# Patient Record
Sex: FEMALE | Race: WHITE | Employment: OTHER | URBAN - METROPOLITAN AREA
[De-identification: names, ages, dates, MRNs, and addresses within clinical notes are randomized per-mention and may not be internally consistent; named-entity substitution may affect disease eponyms.]

---

## 2023-11-21 ENCOUNTER — OFFICE VISIT (OUTPATIENT)
Age: 83
End: 2023-11-21

## 2023-11-21 VITALS
SYSTOLIC BLOOD PRESSURE: 134 MMHG | HEIGHT: 63 IN | WEIGHT: 202 LBS | HEART RATE: 105 BPM | DIASTOLIC BLOOD PRESSURE: 85 MMHG | BODY MASS INDEX: 35.79 KG/M2

## 2023-11-21 DIAGNOSIS — I10 PRIMARY HYPERTENSION: Primary | ICD-10-CM

## 2023-11-21 DIAGNOSIS — Z95.0 PACEMAKER: ICD-10-CM

## 2023-11-21 DIAGNOSIS — R07.89 CHEST DISCOMFORT: ICD-10-CM

## 2023-11-21 DIAGNOSIS — I65.29 CAROTID ARTERY STENOSIS WITHOUT CEREBRAL INFARCTION, UNSPECIFIED LATERALITY: ICD-10-CM

## 2023-11-21 PROBLEM — I49.9 ARRHYTHMIA: Status: ACTIVE | Noted: 2023-11-21

## 2023-11-21 RX ORDER — TRAMADOL HYDROCHLORIDE 50 MG/1
50 TABLET ORAL AS NEEDED
COMMUNITY

## 2023-11-21 RX ORDER — NORTRIPTYLINE HYDROCHLORIDE 25 MG/1
25 CAPSULE ORAL NIGHTLY
COMMUNITY

## 2023-11-21 RX ORDER — MONTELUKAST SODIUM 10 MG/1
10 TABLET ORAL DAILY
COMMUNITY

## 2023-11-21 RX ORDER — ONDANSETRON 4 MG/1
4 TABLET, FILM COATED ORAL EVERY 8 HOURS PRN
COMMUNITY

## 2023-11-21 RX ORDER — SPIRONOLACTONE 25 MG/1
25 TABLET ORAL DAILY
COMMUNITY

## 2023-11-21 RX ORDER — LEVOTHYROXINE SODIUM 0.05 MG/1
50 TABLET ORAL DAILY
COMMUNITY

## 2023-11-21 RX ORDER — LANOLIN ALCOHOL/MO/W.PET/CERES
400 CREAM (GRAM) TOPICAL DAILY
COMMUNITY

## 2023-11-21 RX ORDER — VALSARTAN 80 MG/1
80 TABLET ORAL DAILY
COMMUNITY

## 2023-11-21 RX ORDER — PINDOLOL 10 MG/1
10 TABLET ORAL 2 TIMES DAILY
COMMUNITY

## 2023-11-21 RX ORDER — ASPIRIN 81 MG/1
81 TABLET, CHEWABLE ORAL DAILY
COMMUNITY

## 2023-11-21 RX ORDER — LORAZEPAM 0.5 MG/1
0.5 TABLET ORAL AS NEEDED
COMMUNITY

## 2023-11-21 RX ORDER — SERTRALINE HYDROCHLORIDE 100 MG/1
100 TABLET, FILM COATED ORAL DAILY
COMMUNITY

## 2023-11-21 RX ORDER — PROPRANOLOL HYDROCHLORIDE 10 MG/1
10 TABLET ORAL AS NEEDED
COMMUNITY

## 2023-11-21 ASSESSMENT — ENCOUNTER SYMPTOMS
COUGH: 0
ABDOMINAL DISTENTION: 0
CHEST TIGHTNESS: 1
CONSTIPATION: 0
WHEEZING: 0
VOMITING: 0
SHORTNESS OF BREATH: 0
DIARRHEA: 0
PHOTOPHOBIA: 0
NAUSEA: 0
ABDOMINAL PAIN: 0

## 2023-11-21 NOTE — PROGRESS NOTES
[facility] CARDIOLOGY  [id number] [place] [address]  PHONE: [phone]        NAME:  Emir Ridley  : 1940  MRN: [id number]     PCP:  Joshua Jose MD    SUBJECTIVE:   Emir Ridley is a 82 y.o. female seen for a consultation visit regarding the following:     Chief Complaint   Patient presents with    Irregular Heart Beat        HPI:  Consultation is requested by Dr. Lennox Prajapati for evaluation of Irregular Heart Beat     She presents to establish new care with a history of hypertension and what she describes sinus node dysfunction with Medtronic MRI safe pacemaker placed in [place] in , with recent generator change out a couple years ago. Recent interrogation at [facility]. due to palpitation shows no arrhythmias with good lead integrity and good battery life. She is having PACs and PVCs on ECG. She has occasional exertional and resting tightness but has a hiatal hernia as well. She denies any radiation of the discomfort any new or worsening exertional dyspnea. Gait is stable with use of a cane. She denies any orthopnea or PND. She does have a history of a 70% unilateral carotid stenosis but she is not sure which side. Past Medical History, Past Surgical History, Family history, Social History, and Medications were all reviewed with the patient today and updated as necessary. Current Outpatient Medications   Medication Sig Dispense Refill    spironolactone (ALDACTONE) 25 MG tablet Take 1 tablet by mouth daily      valsartan (DIOVAN) 80 MG tablet Take 1 tablet by mouth daily      pindolol (VISKEN) 10 MG tablet Take 1 tablet by mouth 2 times daily      ondansetron (ZOFRAN) 4 MG tablet Take 1 tablet by mouth every 8 hours as needed for Nausea or Vomiting      propranolol (INDERAL) 10 MG tablet Take 1 tablet by mouth as needed      LORazepam (ATIVAN) 0.5 MG tablet Take 1 tablet by mouth as needed for Anxiety.       nortriptyline (PAMELOR) 25 MG capsule Take 1 capsule

## 2024-01-14 PROBLEM — I65.23 BILATERAL CAROTID ARTERY STENOSIS WITHOUT CEREBRAL INFARCTION: Status: ACTIVE | Noted: 2024-01-14

## 2024-01-14 PROBLEM — R07.89 CHEST DISCOMFORT: Status: ACTIVE | Noted: 2024-01-14

## 2024-01-14 NOTE — PROGRESS NOTES
[facility] CARDIOLOGY  [address] [place] [address], [address]  [place] [address]  PHONE: [phone]        NAME:  Paula Craig  : 1940  MRN: [id number]     PCP:  Norma Israel MD    SUBJECTIVE:   Paula Craig is a 83 y.o. female seen for a consultation visit regarding the following:     Chief Complaint   Patient presents with    Hypertension        HPI:     She presented recently to establish new care with a history of hypertension and what she describes sinus node dysfunction with Medtronic MRI safe pacemaker placed in [place] in , with recent generator change out a couple years ago.  Recent interrogation at [facility] due to palpitation shows no arrhythmias with good lead integrity and good battery life.  She was having PACs and PVCs on initial ECG but none on current ECG.  She has occasional exertional and resting tightness but has a hiatal hernia as well.  She denies any radiation of the discomfort any new or worsening exertional dyspnea.  Gait is stable with use of a cane.  She denies any orthopnea or PND.  She does have a history of a 70% unilateral carotid stenosis but she is not sure which side.    Nuclear stress test 1/10/2024:    Stress Combined Conclusion: Normal pharmacological myocardial perfusion study. Findings suggest a low risk of cardiac events.    Perfusion Comments: LV perfusion is normal.    ECG: Resting ECG demonstrates atrial pacing.    Stress Test: A pharmacological stress test was performed using lexiscan. No complaints of chest pain. Patient complained of dyspnea and leg discomfort after Lexiscan. PO caffeine given as a reversal agent at minute 2 of recovery.    Echo 2024: I reviewed the echo images and her ejection fraction is closer to 55%.  I do not agree with a 40 to 45% ejection fraction estimate as noted below.  Left Ventricle Mildly reduced left ventricular systolic function with a visually estimated EF of 40 - 45%. Left ventricle size is normal. Mildly

## 2024-01-16 ENCOUNTER — OFFICE VISIT (OUTPATIENT)
Age: 84
End: 2024-01-16

## 2024-01-16 VITALS
HEIGHT: 62 IN | HEART RATE: 70 BPM | SYSTOLIC BLOOD PRESSURE: 142 MMHG | BODY MASS INDEX: 37.32 KG/M2 | DIASTOLIC BLOOD PRESSURE: 76 MMHG | WEIGHT: 202.8 LBS

## 2024-01-16 DIAGNOSIS — Z95.0 PACEMAKER: Primary | ICD-10-CM

## 2024-01-16 DIAGNOSIS — R07.89 CHEST DISCOMFORT: ICD-10-CM

## 2024-01-16 DIAGNOSIS — I10 PRIMARY HYPERTENSION: ICD-10-CM

## 2024-01-16 DIAGNOSIS — I65.23 BILATERAL CAROTID ARTERY STENOSIS WITHOUT CEREBRAL INFARCTION: ICD-10-CM

## 2024-01-16 RX ORDER — PANTOPRAZOLE SODIUM 40 MG/1
40 TABLET, DELAYED RELEASE ORAL
Qty: 90 TABLET | Refills: 1 | Status: SHIPPED | OUTPATIENT
Start: 2024-01-16

## 2024-01-16 ASSESSMENT — ENCOUNTER SYMPTOMS: CHEST TIGHTNESS: 0

## 2024-01-19 ENCOUNTER — NURSE ONLY (OUTPATIENT)
Age: 84
End: 2024-01-19

## 2024-01-19 DIAGNOSIS — I49.5 SSS (SICK SINUS SYNDROME) (HCC): Primary | ICD-10-CM

## 2024-04-08 ASSESSMENT — ENCOUNTER SYMPTOMS
CHEST TIGHTNESS: 0
SHORTNESS OF BREATH: 0
DIARRHEA: 0
CONSTIPATION: 0
COUGH: 0
ABDOMINAL PAIN: 0
WHEEZING: 0
PHOTOPHOBIA: 0
VOMITING: 0
NAUSEA: 0
ABDOMINAL DISTENTION: 0

## 2024-04-08 NOTE — PROGRESS NOTES
Abdominal:      General: Abdomen is flat. Bowel sounds are normal. There is no distension.      Palpations: Abdomen is soft.      Tenderness: There is no abdominal tenderness.   Musculoskeletal:         General: Normal range of motion.      Cervical back: Normal range of motion and neck supple. No tenderness.      Comments: Nonpitting edema of the ankles and above both ankles   Skin:     General: Skin is warm and dry.   Neurological:      General: No focal deficit present.      Mental Status: She is alert and oriented to person, place, and time.   Psychiatric:         Mood and Affect: Mood normal.         Behavior: Behavior normal.          Medical problems and test results were reviewed with the patient today.        No results found for: \"CHOL\"  No results found for: \"TRIG\"  No results found for: \"HDL\"  No results found for: \"LDLCHOLESTEROL\", \"LDLCALC\"  No results found for: \"VLDL\"  No results found for: \"CHOLHDLRATIO\"     No results found for: \"NA\", \"K\", \"CL\", \"CO2\", \"BUN\", \"CREATININE\", \"GLUCOSE\", \"CALCIUM\"      No results found for: \"WBC\", \"HGB\", \"HCT\", \"MCV\", \"PLT\"     No results found for: \"TSHFT4\", \"TSH\"     No results found for: \"LABA1C\"    No results found for any visits on 04/12/24.       ASSESSMENT and PLAN:    Diagnoses and all orders for this visit:    Primary hypertension- stable at present but has a history of labile blood pressures with occasional postural orthostasis and symptoms of dizziness and weakness.  Emphasized taking meds at same time every day, staying well-hydrated and minimizing sodium.  Hold meds and eat salty meal, drink fluids on days she is postural.  Mild permissive hypertension okay given her age and frailty at this point.    Pacemaker-Medtronic dual- chamber pacer - enrolled in pacer clinic per routine, atrial pacing, minimal V pacing and minimal atrial fibs at most.  Good lead integrity and good battery life.  Continue per routine.    Carotid artery stenosis without cerebral

## 2024-04-12 ENCOUNTER — OFFICE VISIT (OUTPATIENT)
Age: 84
End: 2024-04-12
Payer: MEDICARE

## 2024-04-12 VITALS
DIASTOLIC BLOOD PRESSURE: 72 MMHG | SYSTOLIC BLOOD PRESSURE: 116 MMHG | WEIGHT: 198 LBS | HEIGHT: 62 IN | BODY MASS INDEX: 36.44 KG/M2 | HEART RATE: 88 BPM

## 2024-04-12 DIAGNOSIS — Z95.0 PACEMAKER: ICD-10-CM

## 2024-04-12 DIAGNOSIS — I10 PRIMARY HYPERTENSION: Primary | ICD-10-CM

## 2024-04-12 DIAGNOSIS — I65.23 BILATERAL CAROTID ARTERY STENOSIS WITHOUT CEREBRAL INFARCTION: ICD-10-CM

## 2024-04-12 PROCEDURE — 1090F PRES/ABSN URINE INCON ASSESS: CPT | Performed by: INTERNAL MEDICINE

## 2024-04-12 PROCEDURE — 99214 OFFICE O/P EST MOD 30 MIN: CPT | Performed by: INTERNAL MEDICINE

## 2024-04-12 PROCEDURE — G8400 PT W/DXA NO RESULTS DOC: HCPCS | Performed by: INTERNAL MEDICINE

## 2024-04-12 PROCEDURE — G8417 CALC BMI ABV UP PARAM F/U: HCPCS | Performed by: INTERNAL MEDICINE

## 2024-04-12 PROCEDURE — 3074F SYST BP LT 130 MM HG: CPT | Performed by: INTERNAL MEDICINE

## 2024-04-12 PROCEDURE — 3078F DIAST BP <80 MM HG: CPT | Performed by: INTERNAL MEDICINE

## 2024-04-12 PROCEDURE — 1123F ACP DISCUSS/DSCN MKR DOCD: CPT | Performed by: INTERNAL MEDICINE

## 2024-04-12 PROCEDURE — G8427 DOCREV CUR MEDS BY ELIG CLIN: HCPCS | Performed by: INTERNAL MEDICINE

## 2024-04-12 PROCEDURE — 1036F TOBACCO NON-USER: CPT | Performed by: INTERNAL MEDICINE

## 2024-04-29 ENCOUNTER — OFFICE VISIT (OUTPATIENT)
Dept: ORTHOPEDIC SURGERY | Age: 84
End: 2024-04-29
Payer: MEDICARE

## 2024-04-29 DIAGNOSIS — M25.552 BILATERAL HIP PAIN: Primary | ICD-10-CM

## 2024-04-29 DIAGNOSIS — M16.12 PRIMARY OSTEOARTHRITIS OF LEFT HIP: ICD-10-CM

## 2024-04-29 DIAGNOSIS — M25.551 BILATERAL HIP PAIN: Primary | ICD-10-CM

## 2024-04-29 DIAGNOSIS — M16.11 PRIMARY OSTEOARTHRITIS OF RIGHT HIP: ICD-10-CM

## 2024-04-29 DIAGNOSIS — M47.816 LUMBAR SPONDYLOSIS: ICD-10-CM

## 2024-04-29 PROCEDURE — 1090F PRES/ABSN URINE INCON ASSESS: CPT | Performed by: PHYSICIAN ASSISTANT

## 2024-04-29 PROCEDURE — 1123F ACP DISCUSS/DSCN MKR DOCD: CPT | Performed by: PHYSICIAN ASSISTANT

## 2024-04-29 PROCEDURE — 1036F TOBACCO NON-USER: CPT | Performed by: PHYSICIAN ASSISTANT

## 2024-04-29 PROCEDURE — 99204 OFFICE O/P NEW MOD 45 MIN: CPT | Performed by: PHYSICIAN ASSISTANT

## 2024-04-29 PROCEDURE — G8417 CALC BMI ABV UP PARAM F/U: HCPCS | Performed by: PHYSICIAN ASSISTANT

## 2024-04-29 PROCEDURE — G8428 CUR MEDS NOT DOCUMENT: HCPCS | Performed by: PHYSICIAN ASSISTANT

## 2024-04-29 PROCEDURE — G8400 PT W/DXA NO RESULTS DOC: HCPCS | Performed by: PHYSICIAN ASSISTANT

## 2024-04-29 NOTE — PROGRESS NOTES
Name: Paula Craig  YOB: 1940  Gender: female  MRN: [id number]    CC:   Chief Complaint   Patient presents with    Joint Pain     Bilateral hip pain will xray today with L-spine          HPI:   The patient is an 83-year-old female presents for first-time visit complaining of bilateral hip pain that has been present for over 5 years and is becoming worse, L,>R.  It hurts at night when sleeping.  There was not an acute injury to the hip.  The pain is located over the groin and thigh.  She also complains of intermittent low back pain  It hurts to walk and pain worsens with increased distance.  The pain does radiate down the left leg at times.  Numbness and tingling are not noted.  The patient is now having difficulty putting socks and shoes on.        Treatment so far has been Tylenol.  She is unable to take NSAIDs due to history of peptic ulcer disease.  She reports history of a-fib, s/p pacermaker placement, LLE DVT x 2 during pregnancy in the distant past.  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    Review of Systems  As per HPI.  Pertinent positives and negatives are addressed with the patient, particularly those related to musculoskeletal concerns.  Non-orthopaedic concerns were referred back to the primary care physician.      PMFSH:    Current Outpatient Medications:     pantoprazole (PROTONIX) 40 MG tablet, Take 1 tablet by mouth every morning (before breakfast), Disp: 90 tablet, Rfl: 1    spironolactone (ALDACTONE) 25 MG tablet, Take 1 tablet by mouth daily, Disp: , Rfl:     valsartan (DIOVAN) 80 MG tablet, Take 1 tablet by mouth daily, Disp: , Rfl:     pindolol (VISKEN) 10 MG tablet, Take 1 tablet by mouth 2 times daily, Disp: , Rfl:     ondansetron (ZOFRAN) 4 MG tablet, Take 1 tablet by mouth every 8 hours as needed for Nausea or Vomiting, Disp: , Rfl:     propranolol (INDERAL) 10 MG tablet, Take 1 tablet by mouth as needed, Disp: , Rfl:

## 2024-05-20 PROCEDURE — 93296 REM INTERROG EVL PM/IDS: CPT | Performed by: INTERNAL MEDICINE

## 2024-05-20 PROCEDURE — 93294 REM INTERROG EVL PM/LDLS PM: CPT | Performed by: INTERNAL MEDICINE
